# Patient Record
Sex: MALE | Race: BLACK OR AFRICAN AMERICAN | Employment: FULL TIME | URBAN - METROPOLITAN AREA
[De-identification: names, ages, dates, MRNs, and addresses within clinical notes are randomized per-mention and may not be internally consistent; named-entity substitution may affect disease eponyms.]

---

## 2020-08-15 ENCOUNTER — HOSPITAL ENCOUNTER (EMERGENCY)
Age: 40
Discharge: HOME | End: 2020-08-15
Attending: EMERGENCY MEDICINE
Payer: COMMERCIAL

## 2020-08-15 ENCOUNTER — APPOINTMENT (OUTPATIENT)
Dept: GENERAL RADIOLOGY | Age: 40
End: 2020-08-15
Payer: COMMERCIAL

## 2020-08-15 VITALS
HEIGHT: 68 IN | TEMPERATURE: 98.2 F | RESPIRATION RATE: 18 BRPM | HEART RATE: 56 BPM | OXYGEN SATURATION: 100 % | BODY MASS INDEX: 36.37 KG/M2 | WEIGHT: 240 LBS | SYSTOLIC BLOOD PRESSURE: 119 MMHG | DIASTOLIC BLOOD PRESSURE: 90 MMHG

## 2020-08-15 LAB
ALBUMIN SERPL-MCNC: 3.9 GM/DL (ref 3.4–5)
ALP BLD-CCNC: 62 IU/L (ref 40–129)
ALT SERPL-CCNC: 21 U/L (ref 10–40)
ANION GAP SERPL CALCULATED.3IONS-SCNC: 8 MMOL/L (ref 4–16)
AST SERPL-CCNC: 18 IU/L (ref 15–37)
BASOPHILS ABSOLUTE: 0.1 K/CU MM
BASOPHILS RELATIVE PERCENT: 1 % (ref 0–1)
BILIRUB SERPL-MCNC: 0.3 MG/DL (ref 0–1)
BUN BLDV-MCNC: 13 MG/DL (ref 6–23)
CALCIUM SERPL-MCNC: 9.1 MG/DL (ref 8.3–10.6)
CHLORIDE BLD-SCNC: 100 MMOL/L (ref 99–110)
CO2: 26 MMOL/L (ref 21–32)
CREAT SERPL-MCNC: 1.1 MG/DL (ref 0.9–1.3)
DIFFERENTIAL TYPE: ABNORMAL
EOSINOPHILS ABSOLUTE: 0.1 K/CU MM
EOSINOPHILS RELATIVE PERCENT: 1.3 % (ref 0–3)
GFR AFRICAN AMERICAN: >60 ML/MIN/1.73M2
GFR NON-AFRICAN AMERICAN: >60 ML/MIN/1.73M2
GLUCOSE BLD-MCNC: 90 MG/DL (ref 70–99)
HCT VFR BLD CALC: 46.7 % (ref 42–52)
HEMOGLOBIN: 15 GM/DL (ref 13.5–18)
IMMATURE NEUTROPHIL %: 0.2 % (ref 0–0.43)
LIPASE: 30 IU/L (ref 13–60)
LYMPHOCYTES ABSOLUTE: 2.9 K/CU MM
LYMPHOCYTES RELATIVE PERCENT: 47.3 % (ref 24–44)
MCH RBC QN AUTO: 28.2 PG (ref 27–31)
MCHC RBC AUTO-ENTMCNC: 32.1 % (ref 32–36)
MCV RBC AUTO: 87.8 FL (ref 78–100)
MONOCYTES ABSOLUTE: 0.6 K/CU MM
MONOCYTES RELATIVE PERCENT: 10 % (ref 0–4)
NUCLEATED RBC %: 0 %
PDW BLD-RTO: 12.9 % (ref 11.7–14.9)
PLATELET # BLD: 318 K/CU MM (ref 140–440)
PMV BLD AUTO: 9.2 FL (ref 7.5–11.1)
POTASSIUM SERPL-SCNC: 4.1 MMOL/L (ref 3.5–5.1)
RBC # BLD: 5.32 M/CU MM (ref 4.6–6.2)
SEGMENTED NEUTROPHILS ABSOLUTE COUNT: 2.5 K/CU MM
SEGMENTED NEUTROPHILS RELATIVE PERCENT: 40.2 % (ref 36–66)
SODIUM BLD-SCNC: 134 MMOL/L (ref 135–145)
TOTAL IMMATURE NEUTOROPHIL: 0.01 K/CU MM
TOTAL NUCLEATED RBC: 0 K/CU MM
TOTAL PROTEIN: 7.5 GM/DL (ref 6.4–8.2)
WBC # BLD: 6.1 K/CU MM (ref 4–10.5)

## 2020-08-15 PROCEDURE — 36415 COLL VENOUS BLD VENIPUNCTURE: CPT

## 2020-08-15 PROCEDURE — 83690 ASSAY OF LIPASE: CPT

## 2020-08-15 PROCEDURE — 80053 COMPREHEN METABOLIC PANEL: CPT

## 2020-08-15 PROCEDURE — 71045 X-RAY EXAM CHEST 1 VIEW: CPT

## 2020-08-15 PROCEDURE — 6370000000 HC RX 637 (ALT 250 FOR IP): Performed by: EMERGENCY MEDICINE

## 2020-08-15 PROCEDURE — 85025 COMPLETE CBC W/AUTO DIFF WBC: CPT

## 2020-08-15 PROCEDURE — 99284 EMERGENCY DEPT VISIT MOD MDM: CPT

## 2020-08-15 RX ORDER — HYDROCODONE BITARTRATE AND ACETAMINOPHEN 5; 325 MG/1; MG/1
1 TABLET ORAL EVERY 6 HOURS PRN
Qty: 10 TABLET | Refills: 0 | Status: SHIPPED | OUTPATIENT
Start: 2020-08-15 | End: 2020-08-18

## 2020-08-15 RX ORDER — ACETAMINOPHEN 325 MG/1
650 TABLET ORAL ONCE
Status: COMPLETED | OUTPATIENT
Start: 2020-08-15 | End: 2020-08-15

## 2020-08-15 RX ADMIN — ACETAMINOPHEN 650 MG: 325 TABLET ORAL at 11:13

## 2020-08-15 ASSESSMENT — PAIN SCALES - GENERAL
PAINLEVEL_OUTOF10: 7
PAINLEVEL_OUTOF10: 5
PAINLEVEL_OUTOF10: 7

## 2020-08-15 ASSESSMENT — PAIN DESCRIPTION - LOCATION: LOCATION: RIB CAGE

## 2020-08-15 ASSESSMENT — ENCOUNTER SYMPTOMS
COUGH: 0
ABDOMINAL PAIN: 1
CONSTIPATION: 0
EYE REDNESS: 0
SHORTNESS OF BREATH: 0
NAUSEA: 0
RHINORRHEA: 0
VOMITING: 0
SORE THROAT: 0
DIARRHEA: 0
WHEEZING: 0
BACK PAIN: 0

## 2020-08-15 ASSESSMENT — PAIN DESCRIPTION - ORIENTATION: ORIENTATION: LEFT

## 2020-08-15 NOTE — ED PROVIDER NOTES
Triage Chief Complaint:   Rib Pain (left sided)    [place]:  Vitaliy Sexton is a 41 y.o. male that presents with left lower rib pain after car moved at work and hit him in the left lower ribs. Pain is constant does not radiate. It does hurt when he takes a deep breath. He did think he also has some pain in the left upper part of his abdomen. No nausea or vomiting. No chest pain. He states occasionally it takes his breath away but denies any shortness of breath. No fevers or chills. No lightheadedness or dizziness. He has been using ibuprofen and lidocaine patches for the pain without significant relief. ROS:   Review of Systems   Constitutional: Negative for chills and fever. HENT: Negative for congestion, rhinorrhea and sore throat. Eyes: Negative for redness and visual disturbance. Respiratory: Negative for cough, shortness of breath and wheezing. Cardiovascular: Positive for chest pain (left lower ribs). Negative for leg swelling. Gastrointestinal: Positive for abdominal pain (LUQ). Negative for constipation, diarrhea, nausea and vomiting. Genitourinary: Negative for dysuria and frequency. Musculoskeletal: Negative for arthralgias and back pain. Skin: Negative for rash and wound. Neurological: Negative for syncope and headaches. Psychiatric/Behavioral: Negative for hallucinations and suicidal ideas. History reviewed. No pertinent past medical history. History reviewed. No pertinent surgical history. History reviewed. No pertinent family history.   Social History     Socioeconomic History    Marital status: Single     Spouse name: Not on file    Number of children: Not on file    Years of education: Not on file    Highest education level: Not on file   Occupational History    Not on file   Social Needs    Financial resource strain: Not on file    Food insecurity     Worry: Not on file     Inability: Not on file    Transportation needs     Medical: Not on file Exam  Constitutional:       General: He is not in acute distress. Appearance: Normal appearance. He is not diaphoretic. HENT:      Head: Normocephalic and atraumatic. Eyes:      General:         Right eye: No discharge. Left eye: No discharge. Conjunctiva/sclera: Conjunctivae normal.   Cardiovascular:      Rate and Rhythm: Normal rate and regular rhythm. Pulses: Normal pulses. Radial pulses are 2+ on the right side and 2+ on the left side. Pulmonary:      Effort: Pulmonary effort is normal. No respiratory distress. Breath sounds: Normal breath sounds. No wheezing or rales. Chest:      Chest wall: Tenderness present. Abdominal:      General: There is no distension. Tenderness: There is no abdominal tenderness. There is no guarding or rebound. Musculoskeletal: Normal range of motion. General: No swelling or tenderness. Skin:     General: Skin is warm and dry. Neurological:      General: No focal deficit present. Mental Status: He is alert. Cranial Nerves: No cranial nerve deficit.    Psychiatric:         Mood and Affect: Mood normal.         Behavior: Behavior normal.         I have reviewed and interpreted all of the currently available lab results from this visit (if applicable):  Results for orders placed or performed during the hospital encounter of 08/15/20   CBC Auto Differential   Result Value Ref Range    WBC 6.1 4.0 - 10.5 K/CU MM    RBC 5.32 4.6 - 6.2 M/CU MM    Hemoglobin 15.0 13.5 - 18.0 GM/DL    Hematocrit 46.7 42 - 52 %    MCV 87.8 78 - 100 FL    MCH 28.2 27 - 31 PG    MCHC 32.1 32.0 - 36.0 %    RDW 12.9 11.7 - 14.9 %    Platelets 321 541 - 244 K/CU MM    MPV 9.2 7.5 - 11.1 FL    Differential Type AUTOMATED DIFFERENTIAL     Segs Relative 40.2 36 - 66 %    Lymphocytes % 47.3 (H) 24 - 44 %    Monocytes % 10.0 (H) 0 - 4 %    Eosinophils % 1.3 0 - 3 %    Basophils % 1.0 0 - 1 %    Segs Absolute 2.5 K/CU MM    Lymphocytes Absolute few Norco for breakthrough pain. Recommend he continue using lidocaine patches and ibuprofen. I did don appropriate PPE (including N95 face mask, protective eye ware/safety glasses, gloves, hair covering, and no isolation gown), as recommended by the health facility/national standard best practice, during my bedside interactions with the patient. The likelihood of other entities in the differential is insufficient to justify any further testing for them. This was explained to the patient. The patient was advised that persistent or worsening symptoms would requirefurther evaluation. Clinical Impression:  1. Rib pain          Ishaan Ashford MD       Please note that portions of this note may have been complete with a voice recognition program.  Effortswere made to edit the dictations, but occasional words are mis-transcribed.           Ishaan Ashford MD  08/16/20 [id number]

## 2025-01-24 ENCOUNTER — TRANSCRIBE ORDERS (OUTPATIENT)
Dept: ADMINISTRATIVE | Age: 45
End: 2025-01-24

## 2025-01-24 DIAGNOSIS — M47.816 LUMBAR SPONDYLOSIS: Primary | ICD-10-CM

## 2025-02-17 ENCOUNTER — HOSPITAL ENCOUNTER (OUTPATIENT)
Dept: MRI IMAGING | Age: 45
Discharge: HOME | End: 2025-02-17
Attending: STUDENT IN AN ORGANIZED HEALTH CARE EDUCATION/TRAINING PROGRAM
Payer: COMMERCIAL

## 2025-02-17 DIAGNOSIS — M47.816 LUMBAR SPONDYLOSIS: ICD-10-CM

## 2025-02-17 PROCEDURE — 72148 MRI LUMBAR SPINE W/O DYE: CPT

## 2025-03-11 ENCOUNTER — OFFICE VISIT (OUTPATIENT)
Dept: NEUROSURGERY | Age: 45
End: 2025-03-11
Payer: COMMERCIAL

## 2025-03-11 VITALS
DIASTOLIC BLOOD PRESSURE: 80 MMHG | BODY MASS INDEX: 40.45 KG/M2 | SYSTOLIC BLOOD PRESSURE: 120 MMHG | OXYGEN SATURATION: 97 % | WEIGHT: 266 LBS | HEART RATE: 99 BPM

## 2025-03-11 DIAGNOSIS — M51.360 DEGENERATION OF INTERVERTEBRAL DISC OF LUMBAR REGION WITH DISCOGENIC BACK PAIN: Primary | ICD-10-CM

## 2025-03-11 DIAGNOSIS — M51.369 BULGING OF LUMBAR INTERVERTEBRAL DISC: ICD-10-CM

## 2025-03-11 PROCEDURE — 99203 OFFICE O/P NEW LOW 30 MIN: CPT | Performed by: PHYSICIAN ASSISTANT

## 2025-03-11 NOTE — PATIENT INSTRUCTIONS
Imaging or labs has been ordered for you.   [facility]  [address] [place] [address]  X-rays do not need an appointment.  To Schedule Bone Scan, CT or MRI  Call at Central [phone]     Patient to follow up with Dr. Beasley

## 2025-03-11 NOTE — PROGRESS NOTES
.    Neurosurgery Office Note    Chief Complaint: Low back pain     HPI:  44 y.o. 1980  Who presents today with complaints of midline low back pain without radiation. He reports an injury at work in 2022 as the cause of his pain. His pain is made worse with prolonged standing, sitting and walking. Laying and reclining is his best position to be in. He has seen Dr. Valdes and Dr. Mathis for injections. His most recent injection was 1/17/25 and was a bilateral L4-5 and L5-S1 medial branch block. He has also had many L4-5 epidural injections without lasting relief. He has no N/T in his legs. He performed PT at [facility] in 2023/2024 without lasting relief. No B/B incontinence. He takes ibuprofen 800mg and naproxen without lasting relief. Patient is 6/10 pain today. Patient is not on any antiplatelets or anticoagulants. PMHx and PSHX listed below.                   Past Medical and Surgical History:   History reviewed. No pertinent past medical history.  History reviewed. No pertinent surgical history.    Social History:    TOBACCO:   reports that he has quit smoking. His smoking use included cigars. He has never used smokeless tobacco.  ETOH:   reports no history of alcohol use.    Family History:   History reviewed. No pertinent family history.    Current Medications:    No current facility-administered medications for this visit.    No Known Allergies     REVIEW OF SYSTEMS:    CONSTITUTIONAL:  negative for fevers, chills, diaphoresis, activity change, appetite change, fatigue, night sweats and unexpected weight change.   EYES:  negative for blurred vision, eye discharge, visual disturbance and icterus  HEENT:  negative for hearing loss, tinnitus, ear drainage, sinus pressure, nasal congestion, epistaxis and snoring  RESPIRATORY:  No cough, shortness of breath, hemoptysis  GASTROINTESTINAL:  negative for nausea, vomiting, diarrhea, constipation, blood in stool and abdominal pain  GENITOURINARY:  negative for frequency,

## 2025-03-12 ENCOUNTER — HOSPITAL ENCOUNTER (OUTPATIENT)
Dept: GENERAL RADIOLOGY | Age: 45
Discharge: HOME | End: 2025-03-12
Payer: COMMERCIAL

## 2025-03-12 ENCOUNTER — HOSPITAL ENCOUNTER (OUTPATIENT)
Age: 45
Discharge: HOME | End: 2025-03-12

## 2025-03-12 ENCOUNTER — HOSPITAL ENCOUNTER (OUTPATIENT)
Age: 45
Discharge: HOME | End: 2025-03-12
Payer: COMMERCIAL

## 2025-03-12 DIAGNOSIS — M51.360 DEGENERATION OF INTERVERTEBRAL DISC OF LUMBAR REGION WITH DISCOGENIC BACK PAIN: ICD-10-CM

## 2025-03-12 DIAGNOSIS — M51.369 BULGING OF LUMBAR INTERVERTEBRAL DISC: ICD-10-CM

## 2025-03-12 PROCEDURE — 72100 X-RAY EXAM L-S SPINE 2/3 VWS: CPT

## 2025-03-12 PROCEDURE — 72120 X-RAY BEND ONLY L-S SPINE: CPT

## 2025-03-12 PROCEDURE — 72081 X-RAY EXAM ENTIRE SPI 1 VW: CPT

## 2025-03-27 NOTE — PROGRESS NOTES
Neurosurgery Office Follow-up: 2025   Patient: Trey Mckee    : 1980     Chief Complaint:  Low back pain    History of Present Illness:  Trey Mckee is a 44 y.o. male who presents 2 to 3 years of lower back pain.  He reports that the symptoms started after a work accident.  He has midline lower back pain which was not radiated down his legs.  The pain is present when he sits for too long when walking.  He had previously done physical therapy and has been following with pain management and has tried facet injections and epidural injections without relief.  He has been trying to lose weight and is on Ozempic type medication at this point.    Patient provided Visual Analogue Scale (VAS) and reports a level of 7.    Past Medical History:   He  has no past medical history on file.     Past Surgical History:   He  has no past surgical history on file.     Social History   He  reports that he has quit smoking. His smoking use included cigars. He has never used smokeless tobacco. He reports that he does not drink alcohol and does not use drugs.    Current Medications:  Current Outpatient Rx   Medication Sig Dispense Refill    naproxen (NAPROSYN) 500 MG tablet Take 1 tablet by mouth 2 times daily for 20 doses. 20 tablet 0        Allergies:  Patient has no known allergies.     Physical Exam:  Today's  height is 1.727 m (5' 8\") and weight is 120.7 kg (266 lb). His blood pressure is 150/110 (abnormal). His oxygen saturation is 96%.      Alert and Oriented x3;   Full awareness and engament in converstaion     Cranial Nerves II-XII grossly intact    5 out of 5 strength throughout  Sensation grossly intact to light touch  Negative Christy, Babinski, no clonus    No tendering to spinal palpation      Review of Tests:  MRI lumbar spine from 2025 personally reviewed and interpreted  Moderate lumbar degenerative disc disease at L4-5 causing moderate stenosis  Epidural lipomatosis at L5-S1 causing moderate

## 2025-04-01 ENCOUNTER — OFFICE VISIT (OUTPATIENT)
Dept: NEUROSURGERY | Age: 45
End: 2025-04-01
Payer: COMMERCIAL

## 2025-04-01 VITALS
DIASTOLIC BLOOD PRESSURE: 110 MMHG | SYSTOLIC BLOOD PRESSURE: 150 MMHG | OXYGEN SATURATION: 96 % | HEIGHT: 68 IN | BODY MASS INDEX: 40.32 KG/M2 | WEIGHT: 266 LBS

## 2025-04-01 DIAGNOSIS — M51.360 DEGENERATION OF INTERVERTEBRAL DISC OF LUMBAR REGION WITH DISCOGENIC BACK PAIN: Primary | ICD-10-CM

## 2025-04-01 PROCEDURE — 99213 OFFICE O/P EST LOW 20 MIN: CPT | Performed by: NEUROLOGICAL SURGERY

## 2025-04-01 NOTE — PATIENT INSTRUCTIONS
A referral has been placed for physical therapy. They will call you to schedule.  [facility]  [address] [place] [place] [id number]  P: [phone]

## 2025-04-14 NOTE — PROGRESS NOTES
Physical Therapy: Initial Evaluation    Patient: Trey Mckee (44 y.o. male)   Examination Date: 04/15/2025  Plan of Care Certification Period: 4/15/2025 to        :  1980 ;    Confirmed: Yes MRN: [id number]  CSN: [id number]   Insurance: Payor: [employer] / Plan: [employer] / Product Type: *No Product type* /   Insurance ID: [id number] - (Medicaid Managed) Secondary Insurance (if applicable):    Referring Physician: Matthew Beasley MD Mehan, Neal, MD   PCP: Brayan Montague MD Visits to Date/Visits Approved:    No Show/Cancelled Appts:   /       Medical Diagnosis: Degeneration of intervertebral disc of lumbar region with discogenic back pain [M51.360] M51.360 (ICD-10-CM) - Degeneration of intervertebral disc of lumbar region with discogenic back pain  Treatment Diagnosis: decreased core/ LE strength     PERTINENT MEDICAL HISTORY   Patient Assessed for Rehabilitation Services: Yes  Self reported health status:: Fair    Medical History: Chart Reviewed: Yes No past medical history on file.  Surgical History: No past surgical history on file.    Medications:   Current Outpatient Medications:     naproxen (NAPROSYN) 500 MG tablet, Take 1 tablet by mouth 2 times daily for 20 doses., Disp: 20 tablet, Rfl: 0  Allergies: Patient has no known allergies.      SUBJECTIVE EXAMINATION     History obtained from:: Chart Review, Patient,      Family/Caregiver Present: No    Subjective History: Onset Date:  (chronic)  Subjective: “couple years started at work. Did workman’s comp and PT and it was making it worst. They said I had to do it again if I want to get the surgery.  We are planning on doing a surgery, but they said I need to lose a couple more pounds. Still walking around moving but not as much. Sitting and standing, walking for long time hurts more. Pain path does not seem to help but they said to wear it until it is done. I did the PT right after the work accident but did not

## 2025-04-14 NOTE — FLOWSHEET NOTE
Outpatient Physical Therapy  [place]           [x] Phone: [phone]   Fax: [phone]  [place]           [] Phone: [phone]   Fax: [phone]        Physical Therapy Daily Treatment Note  Date:  4/15/2025    Patient Name:  Trey Mckee    :  1980  MRN: [id number]  Restrictions/Precautions: No data recorded      Diagnosis:   Degeneration of intervertebral disc of lumbar region with discogenic back pain [M51.360] Diagnosis: M51.360 (ICD-10-CM) - Degeneration of intervertebral disc of lumbar region with discogenic back pain  Date of Injury/Surgery:   Treatment Diagnosis:  decreased core/ LE strength  Insurance[facility]Certification information: [facility]  Referring Physician:  Matthew Beasley MD Mehan, Neal, MD   PCP: Brayan Montague MD  Next Doctor Visit:    Plan of care signed (Y/N):  sent day of eval   Outcome Measure: oswestry :   Visit# / total visits:    30 pcy   Pain level: 7/10   Goals:     Patient goals: to manage the pain and lose weight    Long Term Goals  Time Frame for Long Term Goals: 6 weeks  Patinet will demonstrate independence with HEP.  Patient will improve 5STS from 20s to <15s with improved eccentric control to demo improved transfer ability.  Patient will ambulate >700ft during 6MWT with less than 2 point increase in LBP symptoms to demo improved tolerance to community ambulation.  Patient will improve Oswestry from 23/50 to <15/50 to demo improved tolerance to functional activities.  Patient will demo x5 SLR with good TA activiation and minimal increase in symptoms to demo improve core strength      Summary of Evaluation:  Assessment: Patient is a 44 year old male who presents to physical therapy with reports of lower back pain which he has been experiencing for >3 years. He reports trialing a bout of PT in past, with facet injections, with minimal relief in symptoms. He reports he is planning to get lumbar disc replacement in the future, but needs to lose weight

## 2025-04-14 NOTE — PLAN OF CARE
Outpatient Physical Therapy           [place]           [x] Phone: [phone]   Fax: [phone]  [place]           [] Phone: [phone]   Fax: [phone]     To: Matthew Beasley MD Mehan, Neal, MD   From: Violeta Cortes, PT, DPT     Patient: Trey Mckee       : 1980  Diagnosis: Degeneration of intervertebral disc of lumbar region with discogenic back pain [M51.360] Diagnosis: M51.360 (ICD-10-CM) - Degeneration of intervertebral disc of lumbar region with discogenic back pain  Treatment Diagnosis: decreased core/ LE strength  Date: 4/15/2025    Physical Therapy Certification/Re-Certification Form  Dear Dr. Beasley,   The following patient has been evaluated for physical therapy services and for therapy to continue, insurance requires physician review of the treatment plan initially and every 90 days. Please review the attached evaluation and/or summary of the patient's plan of care, and verify that you agree therapy should continue by signing the attached document and sending it back to our office.    Assessment:     Patient is a 44 year old male who presents to physical therapy with reports of lower back pain which he has been experiencing for >3 years. He reports trialing a bout of PT in past, with facet injections, with minimal relief in symptoms. He reports he is planning to get lumbar disc replacement in the future, but needs to lose weight first to be a good candidate for successful surgical intervention. He reports increases in pain with prolonged sitting/standing, ambulating longer distances. Upon assessment, he demonstrates deficits in proximal muscle strength, bilaterally. He demonstrate hypertonicity and TTP along lumbar paraspinals and hypomobility with PA mobs of lumbar segments to sacrum. He also demonstrates relative anterior pelvic tilt and hip ER in resting. He would benefit from ongoing skilled physical therapy to address deficits, return to PLOF, and reduce risk for further

## 2025-04-15 ENCOUNTER — HOSPITAL ENCOUNTER (OUTPATIENT)
Dept: PHYSICAL THERAPY | Age: 45
Setting detail: THERAPIES SERIES
Discharge: HOME | End: 2025-04-15
Payer: COMMERCIAL

## 2025-04-15 PROCEDURE — 97110 THERAPEUTIC EXERCISES: CPT

## 2025-04-15 PROCEDURE — 97161 PT EVAL LOW COMPLEX 20 MIN: CPT

## 2025-04-15 ASSESSMENT — PAIN DESCRIPTION - DESCRIPTORS: DESCRIPTORS: ACHING

## 2025-04-15 ASSESSMENT — PAIN DESCRIPTION - PAIN TYPE: TYPE: CHRONIC PAIN

## 2025-04-15 ASSESSMENT — PAIN SCALES - GENERAL: PAINLEVEL_OUTOF10: 7

## 2025-04-15 ASSESSMENT — PAIN DESCRIPTION - LOCATION: LOCATION: BACK

## 2025-04-22 ENCOUNTER — HOSPITAL ENCOUNTER (OUTPATIENT)
Dept: PHYSICAL THERAPY | Age: 45
Discharge: HOME | End: 2025-04-22

## 2025-04-22 NOTE — FLOWSHEET NOTE
Physical Therapy  Cancellation/No-show Note  Patient Name:  Trey Mckee  :  1980   Date:  2025  Cancelled visits to date: 1  No-shows to date: 0    For today's appointment patient:  [x]  Cancelled  []  Rescheduled appointment  []  No-show     Reason given by patient:  []  Patient ill  []  Conflicting appointment  []  No transportation    []  Conflict with work  []  No reason given  [x]  Other:     Comments:      Electronically signed by:  Jyoti Gomez PTA      2025,12:13 PM

## 2025-04-29 ENCOUNTER — HOSPITAL ENCOUNTER (OUTPATIENT)
Dept: PHYSICAL THERAPY | Age: 45
Setting detail: THERAPIES SERIES
Discharge: HOME | End: 2025-04-29
Payer: COMMERCIAL

## 2025-04-29 PROCEDURE — G0283 ELEC STIM OTHER THAN WOUND: HCPCS

## 2025-04-29 PROCEDURE — 97110 THERAPEUTIC EXERCISES: CPT

## 2025-04-29 NOTE — FLOWSHEET NOTE
sitting/standing, ambulating longer distances. Upon assessment, he demonstrates deficits in proximal muscle strength, bilaterally. He demonstrate hypertonicity and TTP along lumbar paraspinals and hypomobility with PA mobs of lumbar segments to sacrum. He also demonstrates relative anterior pelvic tilt and hip ER in resting. He would benefit from ongoing skilled physical therapy to address deficits, return to PLOF, and reduce risk for further decline in function.      Plan for Next Session: progress as tolerable; STM lumbar paraspinals, trial manual traction,        Time In / Time Out:   1301 / 1349        Timed Code/Total Treatment Minutes:  38'/ 48' 3 TE ( 38') 1 e-stim (10'       Next Progress Note due:  30 days or 10 visits       Plan of Care Interventions:  [x] Therapeutic Exercise  [x] Modalities:  [x] Therapeutic Activity     [] Ultrasound  [x] Estim  [] Gait Training      [] Cervical Traction [] Lumbar Traction  [x] Neuromuscular Re-education    [x] Cold/hotpack [] Iontophoresis   [x] Instruction in HEP      [] Vasopneumatic   [] Dry Needling    [x] Manual Therapy               [] Aquatic Therapy              Electronically signed by:  Jyoti Gomez PTA 4/29/2025, 10:21 AM     4/29/2025,3:16 PM

## 2025-05-06 ENCOUNTER — HOSPITAL ENCOUNTER (OUTPATIENT)
Dept: PHYSICAL THERAPY | Age: 45
Discharge: HOME | End: 2025-05-06

## 2025-05-06 NOTE — FLOWSHEET NOTE
Physical Therapy  Cancellation/No-show Note  Patient Name:  Trey Mckee  :  1980   Date:  2025  Cancelled visits to date: 2  No-shows to date: 0    For today's appointment patient:  [x]  Cancelled  []  Rescheduled appointment  []  No-show     Reason given by patient:  []  Patient ill  []  Conflicting appointment  []  No transportation    []  Conflict with work  []  No reason given  [x]  Other:     Comments:  had to take  his mom to appointment at same time     Electronically signed by:  Violeta Cortes, PT, DPT

## 2025-05-10 ENCOUNTER — HOSPITAL ENCOUNTER (OUTPATIENT)
Dept: PHYSICAL THERAPY | Age: 45
Setting detail: THERAPIES SERIES
Discharge: HOME | End: 2025-05-10
Payer: COMMERCIAL

## 2025-05-10 PROCEDURE — 97112 NEUROMUSCULAR REEDUCATION: CPT

## 2025-05-10 PROCEDURE — 97110 THERAPEUTIC EXERCISES: CPT

## 2025-05-10 PROCEDURE — G0283 ELEC STIM OTHER THAN WOUND: HCPCS

## 2025-05-10 NOTE — FLOWSHEET NOTE
Outpatient Physical Therapy  [place]           [x] Phone: [phone]   Fax: [phone]  [place]           [] Phone: [phone]   Fax: [phone]        Physical Therapy Daily Treatment Note  Date:  5/10/2025    Patient Name:  Trey Mckee  \"AYAN\"  :  1980  MRN: [id number]  Restrictions/Precautions: No data recorded      Diagnosis:   Degeneration of intervertebral disc of lumbar region with discogenic back pain [M51.360]    Date of Injury/Surgery:   Treatment Diagnosis:   decreased core/ LE strength   Insurance/Certification information:    Referring Physician:  Matthew Beasley MD     PCP: Brayan Montague MD  Next Doctor Visit:    Plan of care signed (Y/N):  sent day of eval   Outcome Measure: oswestry :   Visit# / total visits:  3 /6  30 pcy   Pain level: 7/10   Goals:     Patient goals: to manage the pain and lose weight    Long Term Goals  Time Frame for Long Term Goals: 6 weeks  Patinet will demonstrate independence with HEP.  Patient will improve 5STS from 20s to <15s with improved eccentric control to demo improved transfer ability.  Patient will ambulate >700ft during 6MWT with less than 2 point increase in LBP symptoms to demo improved tolerance to community ambulation.  Patient will improve Oswestry from 23/50 to <15/50 to demo improved tolerance to functional activities.  Patient will demo x5 SLR with good TA activiation and minimal increase in symptoms to demo improve core strength    Summary of Evaluation:  Assessment: Patient is a 44 year old male who presents to physical therapy with reports of lower back pain which he has been experiencing for >3 years. He reports trialing a bout of PT in past, with facet injections, with minimal relief in symptoms. He reports he is planning to get lumbar disc replacement in the future, but needs to lose weight first to be a good candidate for successful surgical intervention. He reports increases in pain with prolonged sitting/standing,

## 2025-05-13 ENCOUNTER — HOSPITAL ENCOUNTER (OUTPATIENT)
Dept: PHYSICAL THERAPY | Age: 45
Setting detail: THERAPIES SERIES
Discharge: HOME | End: 2025-05-13
Payer: COMMERCIAL

## 2025-05-13 PROCEDURE — 97530 THERAPEUTIC ACTIVITIES: CPT

## 2025-05-13 PROCEDURE — 97110 THERAPEUTIC EXERCISES: CPT

## 2025-05-13 NOTE — FLOWSHEET NOTE
Outpatient Physical Therapy  [place]           [x] Phone: [phone]   Fax: [phone]  [place]           [] Phone: [phone]   Fax: [phone]        Physical Therapy Daily Treatment Note  Date:  2025    Patient Name:  Tery Mckee  \"AYAN\"  :  1980  MRN: [id number]  Restrictions/Precautions: No data recorded      Diagnosis:   Degeneration of intervertebral disc of lumbar region with discogenic back pain [M51.360]    Date of Injury/Surgery:   Treatment Diagnosis:   decreased core/ LE strength   Insurance/Certification information:    Referring Physician:  Matthew Beasley MD     PCP: Brayan Montague MD  Next Doctor Visit:    Plan of care signed (Y/N):  sent day of eval   Outcome Measure: oswestry :   Visit# / total visits:    30 pcy   Pain level: 7/10   Goals:     Patient goals: to manage the pain and lose weight PROGRESSING 25     Long Term Goals  Time Frame for Long Term Goals: 6 weeks  Patinet will demonstrate independence with HEP. PARTIALLY MET 25   Patient will improve 5STS from 20s to <15s with improved eccentric control to demo improved transfer ability. PARTIALLY MET 25   Patient will ambulate >700ft during 6MWT with less than 2 point increase in LBP symptoms to demo improved tolerance to community ambulation. PARTIALLY MET 25   Patient will improve Oswestry from 23/50 to <15/50 to demo improved tolerance to functional activities. PROGRESSING 25   Patient will demo x5 SLR with good TA activiation and minimal increase in symptoms to demo improve core strength NOT MET 25     Summary of Evaluation:  Assessment: Patient is a 44 year old male who presents to physical therapy with reports of lower back pain which he has been experiencing for >3 years. He reports trialing a bout of PT in past, with facet injections, with minimal relief in symptoms. He reports he is planning to get lumbar disc replacement in the future, but needs to lose weight

## 2025-05-13 NOTE — PROGRESS NOTES
Outpatient Physical Therapy           [place]           [x] Phone: [phone]   Fax: [phone]  [place]           [] Phone: [phone]   Fax: [phone]      To: Matthew Beasley MD     From: Violeta Cortes, PT, DPT     Patient: Trey Mckee                    : 1980  Diagnosis:  Degeneration of intervertebral disc of lumbar region with discogenic back pain [M51.360]        Treatment Diagnosis:    decreased core/ LE strength   Date: 2025  [x]  Progress Note                []  Discharge Note    Evaluation Date:  4/15/25   Total Visits to date:   4 Cancels/No-shows to date:  2    Subjective:     \"back is still hurting, I took pain medication again this morning. It is probably about a 7/10. I dont think the exercises are helping much right now. We can try a couple more visits to see if it starts to help with more. I still get the pain most when I am lifting or doing the sit to stands.the heat hurt my back more and the ice did not really do anything \"      Pain: 710 after pain medication        Plan of Care/Treatment to date:  [x] Therapeutic Exercise    [x] Modalities:  [x] Therapeutic Activity     [] Ultrasound  [x] Electrical Stimulation  [] Gait Training      [] Cervical Traction   [] Lumbar Traction  [x] Neuromuscular Re-education  [x] Cold/hotpack [] Iontophoresis  [x] Instruction in HEP      Other:  [x] Manual Therapy       [x]  Vasopneumatic  [] Aquatic Therapy       []   Dry Needle Therapy                      Objective/Significant Findings At Last Visit/Comments:    25  -5STS: 15s no UE, mild increase in symptoms ; relative anterior pelvic tilt   -6MWT: 1162ft, 8/10 pain   -Oswestry: 17/50   -TA: improved TA contraction with tactile cues for technique; does still demo moderate abdominal doming indicating core strength deficits   -Pelvic alignment appears mild left upslip: although traction/LE pull increased symptoms today        Assessment:     Today is a progress check for

## 2025-05-29 ENCOUNTER — HOSPITAL ENCOUNTER (OUTPATIENT)
Dept: PHYSICAL THERAPY | Age: 45
Discharge: HOME | End: 2025-05-29

## 2025-05-29 NOTE — FLOWSHEET NOTE
Physical Therapy  Cancellation/No-show Note  Patient Name:  Trey Mckee  :  1980   Date:  2025  Cancelled visits to date: 3  No-shows to date: 0    For today's appointment patient:  [x]  Cancelled  []  Rescheduled appointment  []  No-show     Reason given by patient:  []  Patient ill  []  Conflicting appointment  []  No transportation    []  Conflict with work  [x]  No reason given  []  Other:     Comments:  Patient rescheduled      Electronically signed by:  Rosalva Brown PTA    1:44 PM  2025

## 2025-06-04 ENCOUNTER — HOSPITAL ENCOUNTER (OUTPATIENT)
Dept: PHYSICAL THERAPY | Age: 45
Setting detail: THERAPIES SERIES
Discharge: HOME | End: 2025-06-04
Payer: COMMERCIAL

## 2025-06-04 PROCEDURE — 97140 MANUAL THERAPY 1/> REGIONS: CPT

## 2025-06-04 PROCEDURE — 97110 THERAPEUTIC EXERCISES: CPT

## 2025-06-04 NOTE — FLOWSHEET NOTE
Outpatient Physical Therapy  [place]           [x] Phone: [phone]   Fax: [phone]  [place]           [] Phone: [phone]   Fax: [phone]        Physical Therapy Daily Treatment Note  Date:  2025    Patient Name:  Trey Mckee  \"AYAN\"  :  1980  MRN: [id number]  Restrictions/Precautions: No data recorded      Diagnosis:   Degeneration of intervertebral disc of lumbar region with discogenic back pain [M51.360]    Date of Injury/Surgery:   Treatment Diagnosis:   decreased core/ LE strength   Insurance/Certification information:    Referring Physician:  Matthew Beasley MD     PCP: Brayan Montague MD  Next Doctor Visit:    Plan of care signed (Y/N):  sent day of eval   Outcome Measure: oswestry :   Visit# / total visits:    30 pcy   Pain level: 7/10   Goals:     Patient goals: to manage the pain and lose weight PROGRESSING 25     Long Term Goals  Time Frame for Long Term Goals: 6 weeks  Patinet will demonstrate independence with HEP. PARTIALLY MET 25   Patient will improve 5STS from 20s to <15s with improved eccentric control to demo improved transfer ability. PARTIALLY MET 25   Patient will ambulate >700ft during 6MWT with less than 2 point increase in LBP symptoms to demo improved tolerance to community ambulation. PARTIALLY MET 25   Patient will improve Oswestry from 23/50 to <15/50 to demo improved tolerance to functional activities. PROGRESSING 25   Patient will demo x5 SLR with good TA activiation and minimal increase in symptoms to demo improve core strength NOT MET 25     Summary of Evaluation:  Assessment: Patient is a 44 year old male who presents to physical therapy with reports of lower back pain which he has been experiencing for >3 years. He reports trialing a bout of PT in past, with facet injections, with minimal relief in symptoms. He reports he is planning to get lumbar disc replacement in the future, but needs to lose weight

## 2025-06-11 ENCOUNTER — TELEPHONE (OUTPATIENT)
Dept: NEUROSURGERY | Age: 45
End: 2025-06-11

## 2025-06-11 ENCOUNTER — HOSPITAL ENCOUNTER (OUTPATIENT)
Dept: PHYSICAL THERAPY | Age: 45
Setting detail: THERAPIES SERIES
Discharge: HOME | End: 2025-06-11
Payer: COMMERCIAL

## 2025-06-11 PROCEDURE — 97110 THERAPEUTIC EXERCISES: CPT

## 2025-06-11 NOTE — FLOWSHEET NOTE
Progress Note due:  30 days or 10 visits     Plan of Care Interventions:  [x] Therapeutic Exercise  [x] Modalities:  [x] Therapeutic Activity     [] Ultrasound  [x] Estim  [] Gait Training      [] Cervical Traction [] Lumbar Traction  [x] Neuromuscular Re-education    [x] Cold/hotpack [] Iontophoresis   [x] Instruction in HEP      [] Vasopneumatic   [] Dry Needling    [x] Manual Therapy               [] Aquatic Therapy            Electronically signed by:  Rosalva Brown PTA      6/11/2025,9:24 AM

## 2025-06-11 NOTE — TELEPHONE ENCOUNTER
Patient returned our call. I explained to patient that Dr. Beasley is changing his schedule and we will need to change his appt on Aug 6. Patient is now scheduled to see Dr. Beasley on Aug 6 at 9:45 am.

## 2025-06-13 ENCOUNTER — HOSPITAL ENCOUNTER (OUTPATIENT)
Dept: PHYSICAL THERAPY | Age: 45
Setting detail: THERAPIES SERIES
Discharge: HOME | End: 2025-06-13
Payer: COMMERCIAL

## 2025-06-13 PROCEDURE — 97110 THERAPEUTIC EXERCISES: CPT

## 2025-06-13 NOTE — FLOWSHEET NOTE
time and enocuraged to reach back out with any questions/concerns while awaiting re-assessment from PCP.       End session pain: 8/10    Plan for Next Session: progress as tolerable;   Time In / Time Out:   9827-0158    Timed Code/Total Treatment Minutes:   32/32: 2 TE     Next Progress Note due:  30 days or 10 visits     Plan of Care Interventions:  [x] Therapeutic Exercise  [x] Modalities:  [x] Therapeutic Activity     [] Ultrasound  [x] Estim  [] Gait Training      [] Cervical Traction [] Lumbar Traction  [x] Neuromuscular Re-education    [x] Cold/hotpack [] Iontophoresis   [x] Instruction in HEP      [] Vasopneumatic   [] Dry Needling    [x] Manual Therapy               [] Aquatic Therapy            Electronically signed by:  Violeta Cortes PT DPT      6/13/2025,7:18 AM

## 2025-08-06 ENCOUNTER — OFFICE VISIT (OUTPATIENT)
Dept: NEUROSURGERY | Age: 45
End: 2025-08-06
Payer: COMMERCIAL

## 2025-08-06 VITALS
HEART RATE: 95 BPM | OXYGEN SATURATION: 96 % | SYSTOLIC BLOOD PRESSURE: 159 MMHG | WEIGHT: 252 LBS | DIASTOLIC BLOOD PRESSURE: 118 MMHG | BODY MASS INDEX: 38.19 KG/M2 | HEIGHT: 68 IN

## 2025-08-06 DIAGNOSIS — M51.360 DEGENERATION OF INTERVERTEBRAL DISC OF LUMBAR REGION WITH DISCOGENIC BACK PAIN: Primary | ICD-10-CM

## 2025-08-06 PROCEDURE — 99213 OFFICE O/P EST LOW 20 MIN: CPT | Performed by: NEUROLOGICAL SURGERY

## 2025-08-06 RX ORDER — LOSARTAN POTASSIUM 100 MG/1
1 TABLET ORAL DAILY
COMMUNITY
Start: 2025-02-05

## 2025-08-06 RX ORDER — ATORVASTATIN CALCIUM 10 MG/1
10 TABLET, FILM COATED ORAL DAILY
COMMUNITY
Start: 2025-04-10